# Patient Record
Sex: FEMALE | Race: WHITE | NOT HISPANIC OR LATINO | Employment: OTHER | URBAN - NONMETROPOLITAN AREA
[De-identification: names, ages, dates, MRNs, and addresses within clinical notes are randomized per-mention and may not be internally consistent; named-entity substitution may affect disease eponyms.]

---

## 2023-11-14 ENCOUNTER — OFFICE VISIT (OUTPATIENT)
Dept: PRIMARY CARE | Facility: CLINIC | Age: 73
End: 2023-11-14
Payer: COMMERCIAL

## 2023-11-14 VITALS
BODY MASS INDEX: 36.07 KG/M2 | DIASTOLIC BLOOD PRESSURE: 78 MMHG | HEART RATE: 76 BPM | SYSTOLIC BLOOD PRESSURE: 135 MMHG | WEIGHT: 196 LBS | HEIGHT: 62 IN

## 2023-11-14 DIAGNOSIS — Z13.220 SCREENING FOR LIPID DISORDERS: ICD-10-CM

## 2023-11-14 DIAGNOSIS — Z13.21 ENCOUNTER FOR VITAMIN DEFICIENCY SCREENING: ICD-10-CM

## 2023-11-14 DIAGNOSIS — Z12.31 SCREENING MAMMOGRAM FOR BREAST CANCER: Primary | ICD-10-CM

## 2023-11-14 DIAGNOSIS — E55.9 VITAMIN D DEFICIENCY: ICD-10-CM

## 2023-11-14 DIAGNOSIS — Z00.00 WELLNESS EXAMINATION: ICD-10-CM

## 2023-11-14 DIAGNOSIS — Z13.1 SCREENING FOR DIABETES MELLITUS: ICD-10-CM

## 2023-11-14 DIAGNOSIS — Z13.820 ENCOUNTER FOR OSTEOPOROSIS SCREENING IN ASYMPTOMATIC POSTMENOPAUSAL PATIENT: ICD-10-CM

## 2023-11-14 DIAGNOSIS — E03.9 ACQUIRED HYPOTHYROIDISM: ICD-10-CM

## 2023-11-14 DIAGNOSIS — Z78.0 ENCOUNTER FOR OSTEOPOROSIS SCREENING IN ASYMPTOMATIC POSTMENOPAUSAL PATIENT: ICD-10-CM

## 2023-11-14 DIAGNOSIS — Z13.29 SCREENING FOR THYROID DISORDER: ICD-10-CM

## 2023-11-14 PROCEDURE — 1159F MED LIST DOCD IN RCRD: CPT | Performed by: INTERNAL MEDICINE

## 2023-11-14 PROCEDURE — G0439 PPPS, SUBSEQ VISIT: HCPCS | Performed by: INTERNAL MEDICINE

## 2023-11-14 PROCEDURE — 1036F TOBACCO NON-USER: CPT | Performed by: INTERNAL MEDICINE

## 2023-11-14 PROCEDURE — 99213 OFFICE O/P EST LOW 20 MIN: CPT | Performed by: INTERNAL MEDICINE

## 2023-11-14 PROCEDURE — 1160F RVW MEDS BY RX/DR IN RCRD: CPT | Performed by: INTERNAL MEDICINE

## 2023-11-14 RX ORDER — LEVOTHYROXINE SODIUM 75 UG/1
1 TABLET ORAL DAILY
COMMUNITY
Start: 2019-07-11 | End: 2024-01-23

## 2023-11-14 ASSESSMENT — PATIENT HEALTH QUESTIONNAIRE - PHQ9
SUM OF ALL RESPONSES TO PHQ9 QUESTIONS 1 AND 2: 0
1. LITTLE INTEREST OR PLEASURE IN DOING THINGS: NOT AT ALL
2. FEELING DOWN, DEPRESSED OR HOPELESS: NOT AT ALL

## 2023-11-14 NOTE — PROGRESS NOTES
Chief Complaint: Medicare Wellness Exam/Comprehensive Problem Focused Follow Up and Physical Exam    HPI:  Patient is here today for MW.   Reports that she is doing well.       Active Problem List  There is no problem list on file for this patient.      Comprehensive Medical/Surgical/Social/Family History  Past Medical History:   Diagnosis Date    Acute upper respiratory infection, unspecified     Acute URI    Contact with and (suspected) exposure to other bacterial communicable diseases     Streptococcus exposure    Personal history of other diseases of the respiratory system     History of pharyngitis    Personal history of other endocrine, nutritional and metabolic disease     History of obesity     Past Surgical History:   Procedure Laterality Date    OTHER SURGICAL HISTORY  11/11/2021    No history of surgery     Social History     Tobacco Use    Smoking status: Never    Smokeless tobacco: Never   Substance Use Topics    Alcohol use: Not Currently    Drug use: Never     No family history on file.      Allergies and Medications  Nut - unspecified, Penicillins, and Sulfa (sulfonamide antibiotics)  Current Outpatient Medications on File Prior to Visit   Medication Sig Dispense Refill    levothyroxine (Synthroid, Levoxyl) 75 mcg tablet Take 1 tablet (75 mcg) by mouth once daily.       No current facility-administered medications on file prior to visit.       Medicare Wellness Questionnaire        How have you been on Medicare less than a year ? No  Have you had a Medicare Wellness exam before ? Yes  Have you had any surgeries in the last year ? No  Have you developed any new diseases in the last year ? No  Have any close family members developed new diseases in the last year ? No  Have you been to a the hospital in the last year ? No  Do you take any pills or supplements other than those prescribed for you ? No  Do you take any opiates for pain such as Tramadol, Percocet or Norco ? No  How do you consider your  "overall health ?Good  Have you ever used tobacco products ? No   Do you drink alcohol ?  No   Have you ever used illegal drugs at anytime in your life including Marijuana ? No   Which of the following describes your diet ?Well balanced  How many days per week on average do you exercise ? Walking  days  Do you have any loss of hearing ? No  Do you have hearing aids ? No  Have you or others noted you have loss of memory ? No  Do you need someone to assist you with any of the following ?none   Do you need someone to assist you with any of the following ?none   Have you fallen in the last 6 months ? No  Do you have any of the following in your house ? None   Do you have a living will ? Yes  Do you have a durable power of  for health care decisions ? Yes    Medications and Supplements  prescribed by me and other practitioners or clinical pharmacist (such as prescriptions, OTC's, herbal therapies and supplements) were reviewed and documented in the medical record.    Tobacco/Alcohol/Opioid use, as well as Illicit Drug Use was screened for/reviewed and documented in Social History section and medication list as appropriate  Activities of Daily Living  In your present state of health, do you have any difficulty performing the following activities?:   Preparing food and eating?: No  Bathing yourself: No  Getting dressed: No  Using the toilet:No  Moving around from place to place: No  In the past year have you fallen or had a near fall?:No    Depression Screen  (Note: if answer to either of the following is \"Yes\", then a more complete depression screening is indicated)   Q1: Over the past two weeks, have you felt down, depressed or hopeless?    Q2: Over the past two weeks, have you felt little interest or pleasure in doing things?      Current exercise habits: The patient does not participate in regular exercise at present.   Dietary issues discussed: Yes  Hearing difficulties: No  Safe in current home environment: " "yes  Visual Acuity assessed: no  Cognitive Impairment assessed: yes       Advance directives  Advanced Care Planning (including a Living Will, Healthcare POA, as well as specific end of life choices and/or directives), was discussed for approximately 1inutes with the patient and/or surrogate, voluntarily, and documented in the medical record.     Cardiac Risk Assessment  Cardiovascular risk was discussed and, if needed, lifestyle modifications recommended, including nutritional choices, exercise, and elimination of habits contributing to risk. We agreed on a plan to reduce the current cardiovascular risk based on above discussion as needed.  Aspirin use/disuse was discussed after reviewing the updated guidelines below:    Consider low dose Aspirin ( mg) use if the benefit for cardiovascular disease prevention outweighs risk for bleeding complications.   In general, low dose ASA should be considered:  In patients WITHOUT prior MI/stroke/PAD (primary prevention):   a. Age <60: Use if 10-year cardiovascular disease risk >20%, with discussion of risks and benefits with patient  b. Age 60-<70: Use if 10-year cardiovascular disease risk >20% and low bleeding (e.g., gastrointenstinal) risk, with discussion of risks and benefits with patient  c. Age >=70: Do not use    In patients WITH prior MI/stroke/PAD (secondary prevention):   Generally use unless extremely high bleeding (e.g., gastrointenstinal) risk, with discussion of risks and benefits with patient    ROS otherwise negative aside from what was mentioned above in HPI.    Vitals  /78   Pulse 76   Ht 1.575 m (5' 2\")   Wt 88.9 kg (196 lb)   BMI 35.85 kg/m²   Body mass index is 35.85 kg/m².  Physical Exam  Gen: Alert, NAD  HEENT:  PERRLA, EOMI, conjunctiva and sclera normal in appearance.   Neck:  Supple with FROM; No masses/nodes palpable; Thyroid nontender and without nodules; No EMY  Respiratory:  Lungs CTAB  Cardiovascular:  Heart RRR. No M/R/G. " Peripheral pulses equal bilaterally  Abdomen:  Soft, nontender, BS present throughout; No R/G/R; No HSM or masses palpated  Extremities:  FROM all extremities; Muscle strength grossly normal with good tone  Neuro:  CN II-XII intact; Reflexes 2+/2+; Gross motor and sensory intact  Skin:  No suspicious lesions present    Flu shot 2023   COVID received   PNA received   Shingles recommended   RSV recommended     Cologuard   Mammo 22, will order   DEXA 21, will order     Assessment and Plan:  Problem List Items Addressed This Visit    None  Visit Diagnoses       Screening mammogram for breast cancer    -  Primary    Relevant Orders    BI mammo bilateral screening tomosynthesis    Encounter for osteoporosis screening in asymptomatic postmenopausal patient        Relevant Orders    XR DEXA bone density    Screening for thyroid disorder        Relevant Orders    TSH with reflex to Free T4 if abnormal    Screening for diabetes mellitus        Screening for lipid disorders        Relevant Orders    Lipid Panel    Wellness examination        Relevant Orders    Comprehensive Metabolic Panel               1. Will check cmp and lipid     2. Hypothyroidism  - check tsh   - continue Synthroid      3. Mammo scheduled with dexa         During the course of the visit the patient was educated and counseled about age appropriate screening and preventive services. Completed preventive screenings were documented in the chart and orders were placed for outstanding screenings/procedures as documented in the Assessment and Plan.      Patient Instructions (the written plan) was given to the patient at check out.      Sharri Marcos DO

## 2023-12-04 ENCOUNTER — ANCILLARY PROCEDURE (OUTPATIENT)
Dept: RADIOLOGY | Facility: CLINIC | Age: 73
End: 2023-12-04
Payer: COMMERCIAL

## 2023-12-04 DIAGNOSIS — Z13.820 ENCOUNTER FOR OSTEOPOROSIS SCREENING IN ASYMPTOMATIC POSTMENOPAUSAL PATIENT: ICD-10-CM

## 2023-12-04 DIAGNOSIS — Z78.0 ENCOUNTER FOR OSTEOPOROSIS SCREENING IN ASYMPTOMATIC POSTMENOPAUSAL PATIENT: ICD-10-CM

## 2023-12-04 DIAGNOSIS — R92.8 ABNORMAL FINDING ON MAMMOGRAPHY: Primary | ICD-10-CM

## 2023-12-04 DIAGNOSIS — Z12.31 SCREENING MAMMOGRAM FOR BREAST CANCER: ICD-10-CM

## 2023-12-04 PROCEDURE — 77080 DXA BONE DENSITY AXIAL: CPT | Performed by: RADIOLOGY

## 2023-12-04 PROCEDURE — 77067 SCR MAMMO BI INCL CAD: CPT | Performed by: RADIOLOGY

## 2023-12-04 PROCEDURE — 77063 BREAST TOMOSYNTHESIS BI: CPT

## 2023-12-04 PROCEDURE — 77080 DXA BONE DENSITY AXIAL: CPT

## 2023-12-04 PROCEDURE — 77063 BREAST TOMOSYNTHESIS BI: CPT | Performed by: RADIOLOGY

## 2023-12-07 ENCOUNTER — LAB (OUTPATIENT)
Dept: LAB | Facility: LAB | Age: 73
End: 2023-12-07
Payer: COMMERCIAL

## 2023-12-07 ENCOUNTER — HOSPITAL ENCOUNTER (OUTPATIENT)
Dept: RADIOLOGY | Facility: HOSPITAL | Age: 73
Discharge: HOME | End: 2023-12-07
Payer: COMMERCIAL

## 2023-12-07 DIAGNOSIS — Z13.21 ENCOUNTER FOR VITAMIN DEFICIENCY SCREENING: ICD-10-CM

## 2023-12-07 DIAGNOSIS — R92.8 ABNORMAL FINDING ON MAMMOGRAPHY: ICD-10-CM

## 2023-12-07 DIAGNOSIS — Z13.220 SCREENING FOR LIPID DISORDERS: ICD-10-CM

## 2023-12-07 DIAGNOSIS — E55.9 VITAMIN D DEFICIENCY: ICD-10-CM

## 2023-12-07 DIAGNOSIS — Z00.00 WELLNESS EXAMINATION: ICD-10-CM

## 2023-12-07 DIAGNOSIS — Z13.29 SCREENING FOR THYROID DISORDER: ICD-10-CM

## 2023-12-07 LAB
25(OH)D3 SERPL-MCNC: 73 NG/ML (ref 30–100)
ALBUMIN SERPL BCP-MCNC: 3.8 G/DL (ref 3.4–5)
ALP SERPL-CCNC: 68 U/L (ref 33–136)
ALT SERPL W P-5'-P-CCNC: 19 U/L (ref 7–45)
ANION GAP SERPL CALC-SCNC: 10 MMOL/L (ref 10–20)
AST SERPL W P-5'-P-CCNC: 22 U/L (ref 9–39)
BILIRUB SERPL-MCNC: 0.5 MG/DL (ref 0–1.2)
BUN SERPL-MCNC: 16 MG/DL (ref 6–23)
CALCIUM SERPL-MCNC: 9 MG/DL (ref 8.6–10.3)
CHLORIDE SERPL-SCNC: 105 MMOL/L (ref 98–107)
CHOLEST SERPL-MCNC: 236 MG/DL (ref 0–199)
CHOLESTEROL/HDL RATIO: 3.5
CO2 SERPL-SCNC: 29 MMOL/L (ref 21–32)
CREAT SERPL-MCNC: 0.85 MG/DL (ref 0.5–1.05)
GFR SERPL CREATININE-BSD FRML MDRD: 73 ML/MIN/1.73M*2
GLUCOSE SERPL-MCNC: 86 MG/DL (ref 74–99)
HDLC SERPL-MCNC: 67 MG/DL
LDLC SERPL CALC-MCNC: 146 MG/DL
NON HDL CHOLESTEROL: 169 MG/DL (ref 0–149)
POTASSIUM SERPL-SCNC: 4.1 MMOL/L (ref 3.5–5.3)
PROT SERPL-MCNC: 6 G/DL (ref 6.4–8.2)
SODIUM SERPL-SCNC: 140 MMOL/L (ref 136–145)
TRIGL SERPL-MCNC: 115 MG/DL (ref 0–149)
TSH SERPL-ACNC: 3.06 MIU/L (ref 0.44–3.98)
VLDL: 23 MG/DL (ref 0–40)

## 2023-12-07 PROCEDURE — 80053 COMPREHEN METABOLIC PANEL: CPT

## 2023-12-07 PROCEDURE — 82306 VITAMIN D 25 HYDROXY: CPT

## 2023-12-07 PROCEDURE — 76642 ULTRASOUND BREAST LIMITED: CPT | Mod: RIGHT SIDE | Performed by: RADIOLOGY

## 2023-12-07 PROCEDURE — 76642 ULTRASOUND BREAST LIMITED: CPT | Mod: RT

## 2023-12-07 PROCEDURE — 36415 COLL VENOUS BLD VENIPUNCTURE: CPT

## 2023-12-07 PROCEDURE — 80061 LIPID PANEL: CPT

## 2023-12-07 PROCEDURE — 84443 ASSAY THYROID STIM HORMONE: CPT

## 2024-01-22 DIAGNOSIS — E03.9 ACQUIRED HYPOTHYROIDISM: Primary | ICD-10-CM

## 2024-01-23 RX ORDER — LEVOTHYROXINE SODIUM 75 UG/1
75 TABLET ORAL DAILY
Qty: 90 TABLET | Refills: 3 | Status: SHIPPED | OUTPATIENT
Start: 2024-01-23

## 2024-07-25 ENCOUNTER — APPOINTMENT (OUTPATIENT)
Dept: PRIMARY CARE | Facility: CLINIC | Age: 74
End: 2024-07-25
Payer: COMMERCIAL

## 2024-07-25 VITALS
WEIGHT: 176 LBS | SYSTOLIC BLOOD PRESSURE: 134 MMHG | DIASTOLIC BLOOD PRESSURE: 77 MMHG | HEIGHT: 62 IN | BODY MASS INDEX: 32.39 KG/M2 | HEART RATE: 69 BPM

## 2024-07-25 DIAGNOSIS — R10.9 FLANK PAIN: Primary | ICD-10-CM

## 2024-07-25 PROCEDURE — 99214 OFFICE O/P EST MOD 30 MIN: CPT | Performed by: INTERNAL MEDICINE

## 2024-07-25 PROCEDURE — 3008F BODY MASS INDEX DOCD: CPT | Performed by: INTERNAL MEDICINE

## 2024-07-25 PROCEDURE — 1036F TOBACCO NON-USER: CPT | Performed by: INTERNAL MEDICINE

## 2024-07-25 PROCEDURE — 1160F RVW MEDS BY RX/DR IN RCRD: CPT | Performed by: INTERNAL MEDICINE

## 2024-07-25 PROCEDURE — 1159F MED LIST DOCD IN RCRD: CPT | Performed by: INTERNAL MEDICINE

## 2024-07-25 ASSESSMENT — ENCOUNTER SYMPTOMS
CONSTIPATION: 0
DYSURIA: 1
RHINORRHEA: 0
ABDOMINAL DISTENTION: 0
DIARRHEA: 1
ABDOMINAL PAIN: 0

## 2024-07-25 NOTE — PROGRESS NOTES
"Subjective   Patient ID: Bess Vicente is a 73 y.o. female who presents for Pelvic Pain (Also reports spasms x6 months/ vomiting/ nausea/Sometimes heating pad helps/Has to take pain medication to help and sleep it off/Intermittent ).  HPI  Patient is here today for pelvic pain.     She reports that she has been having lower flank pain that is intermittent, within 30 min will have severe nausea, increased fluids. Denies blood in her urine. +vomiting and diarrhea everytime she has this pain.     Review of Systems   HENT:  Negative for congestion, dental problem and rhinorrhea.    Cardiovascular:  Negative for chest pain and leg swelling.   Gastrointestinal:  Positive for diarrhea. Negative for abdominal distention, abdominal pain and constipation.   Genitourinary:  Positive for dysuria.       Objective   /77   Pulse 69   Ht 1.575 m (5' 2\")   Wt 79.8 kg (176 lb)   BMI 32.19 kg/m²     Physical Exam  Constitutional:       General: She is not in acute distress.     Appearance: Normal appearance.   HENT:      Head: Normocephalic.      Nose: Nose normal.      Mouth/Throat:      Pharynx: No oropharyngeal exudate.   Eyes:      General:         Right eye: No discharge.         Left eye: No discharge.      Extraocular Movements: Extraocular movements intact.      Pupils: Pupils are equal, round, and reactive to light.   Cardiovascular:      Rate and Rhythm: Normal rate and regular rhythm.      Heart sounds: No murmur heard.     No gallop.   Pulmonary:      Effort: Pulmonary effort is normal. No respiratory distress.      Breath sounds: Normal breath sounds. No wheezing.   Abdominal:      General: Bowel sounds are normal. There is no distension.      Palpations: Abdomen is soft.      Tenderness: There is no abdominal tenderness.   Musculoskeletal:         General: No swelling. Normal range of motion.   Skin:     General: Skin is warm and dry.      Coloration: Skin is not jaundiced.   Neurological:      General: No focal " deficit present.      Mental Status: She is alert and oriented to person, place, and time.      Cranial Nerves: No cranial nerve deficit.   Psychiatric:         Mood and Affect: Mood normal.         Behavior: Behavior normal.       Flu shot 2023   COVID received   PNA received   Shingles recommended   RSV recommended      Cologuard   Mammo 22, 12/23  DEXA 21, 12/23     Assessment/Plan   Problem List Items Addressed This Visit    None  Visit Diagnoses       Flank pain    -  Primary    Relevant Orders    Comprehensive Metabolic Panel    CBC and Auto Differential    Urinalysis with Reflex Microscopic    Urine Culture    CT abdomen w and wo IV contrast          1 Intermittent right severe flank pain with nausea and vomiting   - differential includes kidney stone vs diverticulitis   - will order cbc, cmp ua and culure   - ct scan w an wo contrast       Final diagnoses:   [R10.9] Flank pain

## 2024-08-01 ENCOUNTER — LAB (OUTPATIENT)
Dept: LAB | Facility: LAB | Age: 74
End: 2024-08-01
Payer: COMMERCIAL

## 2024-08-01 DIAGNOSIS — R10.9 FLANK PAIN: ICD-10-CM

## 2024-08-01 LAB
ALBUMIN SERPL BCP-MCNC: 3.9 G/DL (ref 3.4–5)
ALP SERPL-CCNC: 57 U/L (ref 33–136)
ALT SERPL W P-5'-P-CCNC: 31 U/L (ref 7–45)
ANION GAP SERPL CALC-SCNC: 10 MMOL/L (ref 10–20)
APPEARANCE UR: CLEAR
AST SERPL W P-5'-P-CCNC: 29 U/L (ref 9–39)
BASOPHILS # BLD AUTO: 0.04 X10*3/UL (ref 0–0.1)
BASOPHILS NFR BLD AUTO: 0.7 %
BILIRUB SERPL-MCNC: 0.4 MG/DL (ref 0–1.2)
BILIRUB UR STRIP.AUTO-MCNC: NEGATIVE MG/DL
BUN SERPL-MCNC: 22 MG/DL (ref 6–23)
CALCIUM SERPL-MCNC: 9.8 MG/DL (ref 8.6–10.3)
CHLORIDE SERPL-SCNC: 105 MMOL/L (ref 98–107)
CO2 SERPL-SCNC: 28 MMOL/L (ref 21–32)
COLOR UR: YELLOW
CREAT SERPL-MCNC: 1.01 MG/DL (ref 0.5–1.05)
EGFRCR SERPLBLD CKD-EPI 2021: 60 ML/MIN/1.73M*2
EOSINOPHIL # BLD AUTO: 0.19 X10*3/UL (ref 0–0.4)
EOSINOPHIL NFR BLD AUTO: 3.4 %
ERYTHROCYTE [DISTWIDTH] IN BLOOD BY AUTOMATED COUNT: 13.8 % (ref 11.5–14.5)
GLUCOSE SERPL-MCNC: 87 MG/DL (ref 74–99)
GLUCOSE UR STRIP.AUTO-MCNC: NORMAL MG/DL
HCT VFR BLD AUTO: 41.6 % (ref 36–46)
HGB BLD-MCNC: 13 G/DL (ref 12–16)
IMM GRANULOCYTES # BLD AUTO: 0.01 X10*3/UL (ref 0–0.5)
IMM GRANULOCYTES NFR BLD AUTO: 0.2 % (ref 0–0.9)
KETONES UR STRIP.AUTO-MCNC: NEGATIVE MG/DL
LEUKOCYTE ESTERASE UR QL STRIP.AUTO: NEGATIVE
LYMPHOCYTES # BLD AUTO: 2.16 X10*3/UL (ref 0.8–3)
LYMPHOCYTES NFR BLD AUTO: 39.2 %
MCH RBC QN AUTO: 29.4 PG (ref 26–34)
MCHC RBC AUTO-ENTMCNC: 31.3 G/DL (ref 32–36)
MCV RBC AUTO: 94 FL (ref 80–100)
MONOCYTES # BLD AUTO: 0.37 X10*3/UL (ref 0.05–0.8)
MONOCYTES NFR BLD AUTO: 6.7 %
NEUTROPHILS # BLD AUTO: 2.74 X10*3/UL (ref 1.6–5.5)
NEUTROPHILS NFR BLD AUTO: 49.8 %
NITRITE UR QL STRIP.AUTO: NEGATIVE
NRBC BLD-RTO: 0 /100 WBCS (ref 0–0)
PH UR STRIP.AUTO: 7 [PH]
PLATELET # BLD AUTO: 234 X10*3/UL (ref 150–450)
POTASSIUM SERPL-SCNC: 4.1 MMOL/L (ref 3.5–5.3)
PROT SERPL-MCNC: 6.5 G/DL (ref 6.4–8.2)
PROT UR STRIP.AUTO-MCNC: NEGATIVE MG/DL
RBC # BLD AUTO: 4.42 X10*6/UL (ref 4–5.2)
RBC # UR STRIP.AUTO: NEGATIVE /UL
SODIUM SERPL-SCNC: 139 MMOL/L (ref 136–145)
SP GR UR STRIP.AUTO: 1.02
UROBILINOGEN UR STRIP.AUTO-MCNC: NORMAL MG/DL
WBC # BLD AUTO: 5.5 X10*3/UL (ref 4.4–11.3)

## 2024-08-01 PROCEDURE — 36415 COLL VENOUS BLD VENIPUNCTURE: CPT

## 2024-08-01 PROCEDURE — 85025 COMPLETE CBC W/AUTO DIFF WBC: CPT

## 2024-08-01 PROCEDURE — 81003 URINALYSIS AUTO W/O SCOPE: CPT

## 2024-08-01 PROCEDURE — 80053 COMPREHEN METABOLIC PANEL: CPT

## 2024-08-01 PROCEDURE — 87086 URINE CULTURE/COLONY COUNT: CPT

## 2024-08-03 LAB — BACTERIA UR CULT: NORMAL

## 2024-09-30 ENCOUNTER — HOSPITAL ENCOUNTER (OUTPATIENT)
Dept: RADIOLOGY | Facility: HOSPITAL | Age: 74
Discharge: HOME | End: 2024-09-30
Payer: COMMERCIAL

## 2024-09-30 ENCOUNTER — TELEPHONE (OUTPATIENT)
Dept: URGENT CARE | Facility: CLINIC | Age: 74
End: 2024-09-30

## 2024-09-30 ENCOUNTER — OFFICE VISIT (OUTPATIENT)
Dept: URGENT CARE | Facility: CLINIC | Age: 74
End: 2024-09-30
Payer: COMMERCIAL

## 2024-09-30 VITALS
TEMPERATURE: 98.7 F | DIASTOLIC BLOOD PRESSURE: 82 MMHG | OXYGEN SATURATION: 99 % | WEIGHT: 176 LBS | RESPIRATION RATE: 16 BRPM | SYSTOLIC BLOOD PRESSURE: 152 MMHG | BODY MASS INDEX: 32.39 KG/M2 | HEIGHT: 62 IN | HEART RATE: 72 BPM

## 2024-09-30 DIAGNOSIS — S99.912A INJURY OF LEFT ANKLE, INITIAL ENCOUNTER: Primary | ICD-10-CM

## 2024-09-30 DIAGNOSIS — S92.355A CLOSED NONDISPLACED FRACTURE OF FIFTH METATARSAL BONE OF LEFT FOOT, INITIAL ENCOUNTER: ICD-10-CM

## 2024-09-30 DIAGNOSIS — S99.912A INJURY OF LEFT ANKLE, INITIAL ENCOUNTER: ICD-10-CM

## 2024-09-30 PROCEDURE — 73630 X-RAY EXAM OF FOOT: CPT | Mod: LEFT SIDE | Performed by: RADIOLOGY

## 2024-09-30 PROCEDURE — 73610 X-RAY EXAM OF ANKLE: CPT | Mod: LEFT SIDE | Performed by: RADIOLOGY

## 2024-09-30 PROCEDURE — 99213 OFFICE O/P EST LOW 20 MIN: CPT | Performed by: NURSE PRACTITIONER

## 2024-09-30 PROCEDURE — 73610 X-RAY EXAM OF ANKLE: CPT | Mod: LT

## 2024-09-30 PROCEDURE — 73630 X-RAY EXAM OF FOOT: CPT | Mod: LT

## 2024-09-30 NOTE — TELEPHONE ENCOUNTER
Appointment for [facility] was made for 10-2-2024 at 930am.  Patient contacted with details and verbalized understanding.

## 2024-09-30 NOTE — PROGRESS NOTES
73 y.o. female presents for evaluation of left ankle pain and swelling that began after inversion injury when stepping off sidewalk just prior to arrival.  No prior injury to this ankle.  Does have some numbness and tingling when she presses on the swelling to the lateral ankle but denies numbness and tingling without palpation.  No other complaints.    Vitals:    09/30/24 1348   BP: 152/82   Pulse: 72   Resp: 16   Temp: 37.1 °C (98.7 °F)   SpO2: 99%       Allergies   Allergen Reactions    Nut - Unspecified Unknown    Penicillins Unknown    Sulfa (Sulfonamide Antibiotics) Unknown       Medication Documentation Review Audit       Reviewed by Sandra Buck MA (Medical Assistant) on 09/30/24 at 1347      Medication Order Taking? Sig Documenting Provider Last Dose Status   Synthroid 75 mcg tablet [id number] Yes TAKE 1 TABLET DAILY Sharri Marcos, DO Taking Active                    Past Medical History:   Diagnosis Date    Acute upper respiratory infection, unspecified     Acute URI    Contact with and (suspected) exposure to other bacterial communicable diseases     Streptococcus exposure    Personal history of other diseases of the respiratory system     History of pharyngitis    Personal history of other endocrine, nutritional and metabolic disease     History of obesity       Past Surgical History:   Procedure Laterality Date    OTHER SURGICAL HISTORY  11/11/2021    No history of surgery       ROS  See HPI    Physical Exam  Vitals and nursing note reviewed.   Constitutional:       Appearance: Normal appearance.   Musculoskeletal:      Left ankle: Swelling and ecchymosis present. Tenderness present over the lateral malleolus. Decreased range of motion. Normal pulse.      Left Achilles Tendon: No tenderness.   Skin:     General: Skin is warm and dry.   Neurological:      General: No focal deficit present.      Mental Status: She is alert and oriented to person, place, and time.   Psychiatric:         Mood and  Affect: Mood normal.         Behavior: Behavior normal.       XR foot left 3+ views  XR ankle left 3+ views  Status: Final result     PACS Images     Show images for XR foot left 3+ views  Signed by    Signed Time Phone Pager   Mauricio Lr MD 9/30/2024 15:42 [phone]     Exam Information    Status Exam Begun Exam Ended   Final 9/30/2024 14:21 9/30/2024 14:36     Study Result    Narrative & Impression   Interpreted By:  Mauricio Lr,   STUDY:  XR FOOT LEFT 3+ VIEWS; XR ANKLE LEFT 3+ VIEWS;  9/30/2024 2:36 pm;  9/30/2024 2:37 pm      INDICATION:  Signs/Symptoms:lateral malleolus tenderness after inversion injury  just prior to arrival today.      COMPARISON:  None.      ACCESSION NUMBER(S):  [id number]; [id number]      ORDERING CLINICIAN:  FADIA OZUNA      FINDINGS:  Bony structures:  There is a transverse fracture of the base of the  5th metatarsal, with diastasis by approximately 3 mm.      Joint spaces:  Mild-to-moderate osteophytosis at the 2nd and 3rd  tarsal-metatarsal joints. Mild hallux valgus angulation and narrowing  of the 1st MTP joint.      Soft tissues:  Unremarkable without significant edema or radiodense  foreign body      Other:  None significant      IMPRESSION:  Fracture of the 5th metatarsal.      MACRO:  None      Signed by: Mauricio Lr 9/30/2024 3:42 PM  Dictation workstation:   [id number]       Assessment/Plan/MDM  Bess was seen today for ankle injury.  Diagnoses and all orders for this visit:  Injury of left ankle, initial encounter (Primary)  -     XR ankle left 3+ views; Future  -     XR foot left 3+ views; Future  Closed nondisplaced fracture of fifth metatarsal bone of left foot, initial encounter    Referral to foot and ankle. Placed in walking boot, encouraged rest, ice, elevate. Patient's clinical presentation is otherwise unremarkable at this time. Patient is discharged with instructions to follow-up with primary care or seek emergency medical attention for  worsening symptoms or any new concerns.    I did personally review Bess's past medical history, surgical history, social history, as well as family history (when relevant).  In this case, I also oversaw the her drug management by reviewing her medication list, allergy list, as well as the medications that I prescribed during the UC course and/or recommended as an out-patient (including possible OTC medications such as acetaminophen, NSAIDs , etc).    After reviewing the items above, I did look at previous medical documentation, such as recent hospitalizations, office visits, and/or recent consultations with PCP/specialist.                          SDOH:   Another factor that I considered in Bess's care was her Social Determinants of Health (SDOH). During this UC encounter, she did not have social determinants of health. Those SDOH influencing Bess's care are: none      Lance Miller CNP  [facility]  [phone]

## 2024-10-04 ENCOUNTER — HOSPITAL ENCOUNTER (OUTPATIENT)
Dept: RADIOLOGY | Facility: HOSPITAL | Age: 74
End: 2024-10-04
Payer: COMMERCIAL

## 2024-10-04 ENCOUNTER — APPOINTMENT (OUTPATIENT)
Dept: RADIOLOGY | Facility: HOSPITAL | Age: 74
End: 2024-10-04
Payer: COMMERCIAL

## 2024-10-23 ENCOUNTER — HOSPITAL ENCOUNTER (OUTPATIENT)
Dept: RADIOLOGY | Facility: CLINIC | Age: 74
Discharge: HOME | End: 2024-10-23
Payer: COMMERCIAL

## 2024-10-23 DIAGNOSIS — S92.355A NONDISPLACED FRACTURE OF FIFTH METATARSAL BONE, LEFT FOOT, INITIAL ENCOUNTER FOR CLOSED FRACTURE: ICD-10-CM

## 2024-10-23 PROCEDURE — 73630 X-RAY EXAM OF FOOT: CPT | Mod: LT

## 2024-11-13 ENCOUNTER — HOSPITAL ENCOUNTER (OUTPATIENT)
Dept: RADIOLOGY | Facility: CLINIC | Age: 74
Discharge: HOME | End: 2024-11-13
Payer: COMMERCIAL

## 2024-11-13 DIAGNOSIS — S92.355A NONDISPLACED FRACTURE OF FIFTH METATARSAL BONE, LEFT FOOT, INITIAL ENCOUNTER FOR CLOSED FRACTURE: ICD-10-CM

## 2024-11-13 PROCEDURE — 73630 X-RAY EXAM OF FOOT: CPT | Mod: LT

## 2024-11-19 ENCOUNTER — APPOINTMENT (OUTPATIENT)
Dept: RADIOLOGY | Facility: HOSPITAL | Age: 74
End: 2024-11-19
Payer: COMMERCIAL

## 2025-01-13 DIAGNOSIS — E03.9 ACQUIRED HYPOTHYROIDISM: ICD-10-CM

## 2025-01-13 RX ORDER — LEVOTHYROXINE SODIUM 75 UG/1
75 TABLET ORAL DAILY
Qty: 90 TABLET | Refills: 3 | Status: SHIPPED | OUTPATIENT
Start: 2025-01-13

## 2025-01-28 ENCOUNTER — TELEPHONE (OUTPATIENT)
Dept: PRIMARY CARE | Facility: CLINIC | Age: 75
End: 2025-01-28
Payer: COMMERCIAL

## 2025-01-28 DIAGNOSIS — Z13.220 SCREENING FOR LIPID DISORDERS: Primary | ICD-10-CM

## 2025-01-28 DIAGNOSIS — Z12.31 SCREENING MAMMOGRAM FOR BREAST CANCER: ICD-10-CM

## 2025-01-28 DIAGNOSIS — Z13.29 SCREENING FOR THYROID DISORDER: ICD-10-CM

## 2025-01-28 NOTE — TELEPHONE ENCOUNTER
Patient called in requesting an order for a Mammogram to be placed she would also like order for labs to include a TSH the only one currently in there is a BMP. Please advise, thanks!

## 2025-02-04 ENCOUNTER — APPOINTMENT (OUTPATIENT)
Dept: RADIOLOGY | Facility: CLINIC | Age: 75
End: 2025-02-04
Payer: COMMERCIAL

## 2025-02-06 ENCOUNTER — HOSPITAL ENCOUNTER (OUTPATIENT)
Dept: RADIOLOGY | Facility: CLINIC | Age: 75
Discharge: HOME | End: 2025-02-06
Payer: COMMERCIAL

## 2025-02-06 VITALS — HEIGHT: 62 IN | BODY MASS INDEX: 32.39 KG/M2 | WEIGHT: 176 LBS

## 2025-02-06 DIAGNOSIS — Z12.31 SCREENING MAMMOGRAM FOR BREAST CANCER: ICD-10-CM

## 2025-02-06 PROCEDURE — 77063 BREAST TOMOSYNTHESIS BI: CPT | Performed by: RADIOLOGY

## 2025-02-06 PROCEDURE — 77067 SCR MAMMO BI INCL CAD: CPT | Performed by: RADIOLOGY

## 2025-02-06 PROCEDURE — 77063 BREAST TOMOSYNTHESIS BI: CPT

## 2025-06-09 ENCOUNTER — APPOINTMENT (OUTPATIENT)
Dept: PRIMARY CARE | Facility: CLINIC | Age: 75
End: 2025-06-09
Payer: COMMERCIAL

## 2025-07-14 ENCOUNTER — APPOINTMENT (OUTPATIENT)
Dept: PRIMARY CARE | Facility: CLINIC | Age: 75
End: 2025-07-14
Payer: COMMERCIAL

## 2025-07-14 VITALS
BODY MASS INDEX: 40.85 KG/M2 | DIASTOLIC BLOOD PRESSURE: 84 MMHG | SYSTOLIC BLOOD PRESSURE: 132 MMHG | HEIGHT: 62 IN | HEART RATE: 74 BPM | WEIGHT: 222 LBS

## 2025-07-14 DIAGNOSIS — Z12.11 ENCOUNTER FOR SCREENING FOR MALIGNANT NEOPLASM OF COLON: ICD-10-CM

## 2025-07-14 DIAGNOSIS — E55.9 VITAMIN D DEFICIENCY: ICD-10-CM

## 2025-07-14 DIAGNOSIS — E66.813 CLASS 3 SEVERE OBESITY DUE TO EXCESS CALORIES WITH SERIOUS COMORBIDITY AND BODY MASS INDEX (BMI) OF 40.0 TO 44.9 IN ADULT: ICD-10-CM

## 2025-07-14 DIAGNOSIS — G47.33 OSA (OBSTRUCTIVE SLEEP APNEA): Primary | ICD-10-CM

## 2025-07-14 DIAGNOSIS — E03.9 ACQUIRED HYPOTHYROIDISM: ICD-10-CM

## 2025-07-14 DIAGNOSIS — M85.80 OSTEOPENIA, UNSPECIFIED LOCATION: ICD-10-CM

## 2025-07-14 DIAGNOSIS — E78.5 DYSLIPIDEMIA: ICD-10-CM

## 2025-07-14 PROCEDURE — 3008F BODY MASS INDEX DOCD: CPT | Performed by: STUDENT IN AN ORGANIZED HEALTH CARE EDUCATION/TRAINING PROGRAM

## 2025-07-14 PROCEDURE — 99214 OFFICE O/P EST MOD 30 MIN: CPT | Performed by: STUDENT IN AN ORGANIZED HEALTH CARE EDUCATION/TRAINING PROGRAM

## 2025-07-14 PROCEDURE — G2211 COMPLEX E/M VISIT ADD ON: HCPCS | Performed by: STUDENT IN AN ORGANIZED HEALTH CARE EDUCATION/TRAINING PROGRAM

## 2025-07-14 PROCEDURE — 1159F MED LIST DOCD IN RCRD: CPT | Performed by: STUDENT IN AN ORGANIZED HEALTH CARE EDUCATION/TRAINING PROGRAM

## 2025-07-14 PROCEDURE — 1160F RVW MEDS BY RX/DR IN RCRD: CPT | Performed by: STUDENT IN AN ORGANIZED HEALTH CARE EDUCATION/TRAINING PROGRAM

## 2025-07-14 RX ORDER — LEVOTHYROXINE SODIUM 75 UG/1
75 TABLET ORAL DAILY
Qty: 90 TABLET | Refills: 3 | Status: SHIPPED | OUTPATIENT
Start: 2025-07-14

## 2025-07-14 NOTE — PROGRESS NOTES
Subjective   Patient ID: Bess Vicente is a 74 y.o. female who presents for Establish Care.    HPI  Hypothyroidism - managed on levothyroxine 75mcg every day which was started about 4-5yrs ago, feeling well at current dose, previously felt cold and fatigued most days, TSH wnl at 2.61 in 2/2025    HLD - LDL above goal at 157 in 2/2025, coronary artery calcium score was 0 in 11/2021, she prefers to manage with diet, she has been trying to work on overall diet in attempt to decrease cholesterol as well as weight    Osteopenia - supplements calcium and vitamin D, has been working on adding weight-bearing exercise, will be due for DEXA 12/2025    TOMMY - tx with CPAP in the past, was then able to lost 75lb with WW and sleep symptoms improved, she has hard time tolerating CPAP and prefers to work on weight loss first prior to trying CPAP again, there is no PND, there is also no hx pancreatitis or fhx thyroid ca    BMI - as above was able to lost 75lb in the past using Weight [employer], has been more difficult to lose and maintain the loss since menopause, her lowest weight was 175lb and is 222lb today, max was >300lb, she is very active outside yung in the summer time,there is     Cervical Cancer Screening: no longer indicated  Breast Cancer Screening: fhx in mother, due 2/2026  Osteoporosis Screening: due 12/2025  Colon Cancer Screening: no fhx, asymptomatic, due, she prefers Cologuard  Tobacco: never  Alcohol: denies  Recreational Drugs: denies  Immunizations: due for TDaP, Shingrix, RSV     Review of Systems   Constitutional:  Negative for chills and fever.   HENT:  Negative for congestion and rhinorrhea.    Eyes:  Negative for redness.   Respiratory:  Negative for cough and shortness of breath.    Cardiovascular:  Negative for chest pain, palpitations and leg swelling.   Gastrointestinal:  Negative for abdominal pain, blood in stool, constipation, diarrhea, nausea and vomiting.   Genitourinary:  Negative for dysuria.  "  Musculoskeletal:  Negative for arthralgias and myalgias.   Skin:  Negative for rash.   Neurological:  Negative for dizziness and headaches.   Psychiatric/Behavioral:  Negative for dysphoric mood. The patient is not nervous/anxious.      Objective   /84   Pulse 74   Ht 1.575 m (5' 2\")   Wt 101 kg (222 lb)   BMI 40.60 kg/m²     Physical Exam  Vitals reviewed.   Constitutional:       General: She is not in acute distress.     Appearance: Normal appearance.   HENT:      Head: Normocephalic and atraumatic.     Eyes:      General: No scleral icterus.     Conjunctiva/sclera: Conjunctivae normal.       Cardiovascular:      Rate and Rhythm: Normal rate and regular rhythm.   Pulmonary:      Effort: Pulmonary effort is normal. No respiratory distress.      Breath sounds: Normal breath sounds.   Abdominal:      General: Bowel sounds are normal. There is no distension.      Palpations: Abdomen is soft.      Tenderness: There is no abdominal tenderness. There is no guarding or rebound.     Musculoskeletal:         General: No deformity.      Cervical back: Normal range of motion and neck supple.     Skin:     General: Skin is warm and dry.      Findings: No rash.     Neurological:      General: No focal deficit present.      Mental Status: She is alert.     Psychiatric:         Mood and Affect: Mood normal.         Behavior: Behavior normal.       Assessment/Plan   Problem List Items Addressed This Visit           ICD-10-CM    Osteopenia M85.80    - Supplementing calcium and vitamin D and working on adding more weight-bearing exercise  - Due for repeat DEXA 12/2025         TOMMY (obstructive sleep apnea) - Primary G47.33    - Unable to tolerate CPAP in the past  - She prefers to manage with lifestyle if able  - She is a great candidate for GLP1  - Medication dosing and side effects reviewed.  - Return precautions reviewed.          Relevant Medications    tirzepatide, weight loss, (Zepbound) 2.5 mg/0.5 mL injection    " Other Relevant Orders    Follow Up In Primary Care - Medicare Annual    Dyslipidemia E78.5    - Dietary modifications and recommendation for 150 minutes of moderate-intensity exercise per week reviewed.  - FLP and ASCVD 10-yr risk reviewed  - Statin indicated, but she prefers to focus on diet first  - Coronary artery calcium score 0 in 11/2021         Relevant Orders    CBC and Auto Differential    Comprehensive Metabolic Panel    Lipid Panel    Follow Up In Primary Care - Medicare Annual    Class 3 severe obesity due to excess calories with serious comorbidity and body mass index (BMI) of 40.0 to 44.9 in adult E66.813, Z68.41    - Dietary modifications and recommendation for 150 minutes of moderate-intensity exercise per week reviewed.          Acquired hypothyroidism E03.9    - Managed on levothyroxine 75mcg every day  - Clinically euthyroid with TSH wnl 2/2025  - No changes today         Relevant Medications    levothyroxine (Synthroid) 75 mcg tablet    Other Relevant Orders    TSH with reflex to Free T4 if abnormal    Follow Up In Primary Care - Medicare Annual     Other Visit Diagnoses         Codes      Encounter for screening for malignant neoplasm of colon     Z12.11    Relevant Orders    Cologuard® colon cancer screening    Follow Up In Primary Care - Medicare Annual      Vitamin D deficiency     E55.9    Relevant Orders    Vitamin D 25-Hydroxy,Total (for eval of Vitamin D levels)    Follow Up In Primary Care - Medicare Annual        Follow up in 3mo for recheck, sooner if needed. Labs prior to appt.

## 2025-07-20 PROBLEM — E03.9 ACQUIRED HYPOTHYROIDISM: Status: ACTIVE | Noted: 2025-07-20

## 2025-07-20 PROBLEM — E78.5 DYSLIPIDEMIA: Status: ACTIVE | Noted: 2025-07-20

## 2025-07-20 PROBLEM — M85.80 OSTEOPENIA: Status: ACTIVE | Noted: 2025-07-20

## 2025-07-20 PROBLEM — G47.33 OSA (OBSTRUCTIVE SLEEP APNEA): Status: ACTIVE | Noted: 2025-07-20

## 2025-07-20 PROBLEM — E66.813 CLASS 3 SEVERE OBESITY DUE TO EXCESS CALORIES WITH SERIOUS COMORBIDITY AND BODY MASS INDEX (BMI) OF 40.0 TO 44.9 IN ADULT: Status: ACTIVE | Noted: 2025-07-20

## 2025-07-20 ASSESSMENT — ENCOUNTER SYMPTOMS
NAUSEA: 0
RHINORRHEA: 0
EYE REDNESS: 0
HEADACHES: 0
BLOOD IN STOOL: 0
PALPITATIONS: 0
CONSTIPATION: 0
DYSURIA: 0
ARTHRALGIAS: 0
MYALGIAS: 0
DYSPHORIC MOOD: 0
COUGH: 0
ABDOMINAL PAIN: 0
DIARRHEA: 0
FEVER: 0
CHILLS: 0
DIZZINESS: 0
SHORTNESS OF BREATH: 0
VOMITING: 0
NERVOUS/ANXIOUS: 0

## 2025-07-20 NOTE — ASSESSMENT & PLAN NOTE
- Managed on levothyroxine 75mcg every day  - Clinically euthyroid with TSH wnl 2/2025  - No changes today

## 2025-07-20 NOTE — ASSESSMENT & PLAN NOTE
- Dietary modifications and recommendation for 150 minutes of moderate-intensity exercise per week reviewed.

## 2025-07-20 NOTE — ASSESSMENT & PLAN NOTE
- Supplementing calcium and vitamin D and working on adding more weight-bearing exercise  - Due for repeat DEXA 12/2025

## 2025-07-20 NOTE — ASSESSMENT & PLAN NOTE
- Unable to tolerate CPAP in the past  - She prefers to manage with lifestyle if able  - She is a great candidate for GLP1  - Medication dosing and side effects reviewed.  - Return precautions reviewed.

## 2025-07-20 NOTE — ASSESSMENT & PLAN NOTE
- Dietary modifications and recommendation for 150 minutes of moderate-intensity exercise per week reviewed.  - FLP and ASCVD 10-yr risk reviewed  - Statin indicated, but she prefers to focus on diet first  - Coronary artery calcium score 0 in 11/2021

## 2025-07-26 ENCOUNTER — RESULTS FOLLOW-UP (OUTPATIENT)
Dept: PRIMARY CARE | Facility: CLINIC | Age: 75
End: 2025-07-26
Payer: COMMERCIAL

## 2025-07-26 LAB — NONINV COLON CA DNA+OCC BLD SCRN STL QL: NEGATIVE

## 2025-07-28 NOTE — TELEPHONE ENCOUNTER
----- Message from Viry Morocho sent at 7/26/2025 10:05 PM EDT -----  Please let Bess know that her Cologuard is negative. This is good for 3yrs. Thank you  ----- Message -----  From: Rin Edwards Results In  Sent: 7/26/2025  12:28 PM EDT  To: Viry Morocho, DO

## 2025-08-20 ENCOUNTER — APPOINTMENT (OUTPATIENT)
Dept: PRIMARY CARE | Facility: CLINIC | Age: 75
End: 2025-08-20
Payer: COMMERCIAL

## 2025-10-22 ENCOUNTER — APPOINTMENT (OUTPATIENT)
Dept: PRIMARY CARE | Facility: CLINIC | Age: 75
End: 2025-10-22
Payer: COMMERCIAL